# Patient Record
Sex: FEMALE | Race: WHITE | HISPANIC OR LATINO | Employment: FULL TIME | URBAN - METROPOLITAN AREA
[De-identification: names, ages, dates, MRNs, and addresses within clinical notes are randomized per-mention and may not be internally consistent; named-entity substitution may affect disease eponyms.]

---

## 2019-06-19 ENCOUNTER — HOSPITAL ENCOUNTER (EMERGENCY)
Facility: MEDICAL CENTER | Age: 49
End: 2019-06-19
Attending: EMERGENCY MEDICINE

## 2019-06-19 VITALS
OXYGEN SATURATION: 97 % | WEIGHT: 163.58 LBS | RESPIRATION RATE: 16 BRPM | DIASTOLIC BLOOD PRESSURE: 89 MMHG | SYSTOLIC BLOOD PRESSURE: 130 MMHG | HEART RATE: 68 BPM | BODY MASS INDEX: 30.88 KG/M2 | TEMPERATURE: 97.6 F | HEIGHT: 61 IN

## 2019-06-19 DIAGNOSIS — M54.50 LUMBAR BACK PAIN: ICD-10-CM

## 2019-06-19 PROCEDURE — A9270 NON-COVERED ITEM OR SERVICE: HCPCS | Performed by: EMERGENCY MEDICINE

## 2019-06-19 PROCEDURE — 700111 HCHG RX REV CODE 636 W/ 250 OVERRIDE (IP): Performed by: EMERGENCY MEDICINE

## 2019-06-19 PROCEDURE — 96372 THER/PROPH/DIAG INJ SC/IM: CPT

## 2019-06-19 PROCEDURE — 99284 EMERGENCY DEPT VISIT MOD MDM: CPT

## 2019-06-19 PROCEDURE — 700102 HCHG RX REV CODE 250 W/ 637 OVERRIDE(OP): Performed by: EMERGENCY MEDICINE

## 2019-06-19 RX ORDER — NAPROXEN 500 MG/1
500 TABLET ORAL 2 TIMES DAILY WITH MEALS
Qty: 15 TAB | Refills: 0 | Status: SHIPPED | OUTPATIENT
Start: 2019-06-19

## 2019-06-19 RX ORDER — CYCLOBENZAPRINE HCL 10 MG
10 TABLET ORAL 3 TIMES DAILY PRN
Qty: 15 TAB | Refills: 0 | Status: SHIPPED | OUTPATIENT
Start: 2019-06-19

## 2019-06-19 RX ORDER — PRAVASTATIN SODIUM 20 MG
20 TABLET ORAL NIGHTLY
COMMUNITY

## 2019-06-19 RX ORDER — CYCLOBENZAPRINE HCL 10 MG
10 TABLET ORAL ONCE
Status: COMPLETED | OUTPATIENT
Start: 2019-06-19 | End: 2019-06-19

## 2019-06-19 RX ORDER — KETOROLAC TROMETHAMINE 30 MG/ML
INJECTION, SOLUTION INTRAMUSCULAR; INTRAVENOUS
Status: DISCONTINUED
Start: 2019-06-19 | End: 2019-06-19 | Stop reason: HOSPADM

## 2019-06-19 RX ORDER — KETOROLAC TROMETHAMINE 30 MG/ML
15 INJECTION, SOLUTION INTRAMUSCULAR; INTRAVENOUS ONCE
Status: COMPLETED | OUTPATIENT
Start: 2019-06-19 | End: 2019-06-19

## 2019-06-19 RX ORDER — LISINOPRIL 10 MG/1
10 TABLET ORAL DAILY
COMMUNITY

## 2019-06-19 RX ADMIN — CYCLOBENZAPRINE 10 MG: 10 TABLET, FILM COATED ORAL at 02:17

## 2019-06-19 RX ADMIN — KETOROLAC TROMETHAMINE 15 MG: 30 INJECTION, SOLUTION INTRAMUSCULAR; INTRAVENOUS at 02:17

## 2019-06-19 NOTE — ED NOTES
Pt reports relief from medication. Reviewed DC instructions with pt. Provided prescriptions X2. Pt verbalized understanding. Pt ambulatory with steady gait to lobby be transported home by friend. VSS on room air. A/Ox4 leaving unit

## 2019-06-19 NOTE — ED PROVIDER NOTES
"ED Provider Note    CHIEF COMPLAINT  Chief Complaint   Patient presents with   • Back Pain     R sided low back pain for a few weeks, worsened significantly today       HPI  Cheryl Mcintosh is a 48 y.o. female who presents with back pain on the left side.  She does it does not radiate.  She says is been worse since she is been sitting.  She denies any IV drug use.  She denies any fevers or chills.  She denies any loss of bladder or bowel symptoms.  She says been gone for a few weeks and significantly worse today.    REVIEW OF SYSTEMS  See HPI for further details. All other systems are negative.     PAST MEDICAL HISTORY   has a past medical history of Hypertension.    SOCIAL HISTORY  Social History     Social History Main Topics   • Smoking status: Never Smoker   • Smokeless tobacco: Never Used   • Alcohol use Yes      Comment: occ   • Drug use: No   • Sexual activity: Not on file       SURGICAL HISTORY  patient denies any surgical history    CURRENT MEDICATIONS  Home Medications     Reviewed by Sunita Mcelroy R.N. (Registered Nurse) on 06/19/19 at 0119  Med List Status: Complete   Medication Last Dose Status   lisinopril (PRINIVIL) 10 MG Tab  Active   pravastatin (PRAVACHOL) 20 MG Tab  Active                ALLERGIES  No Known Allergies    PHYSICAL EXAM  VITAL SIGNS: /92   Pulse 70   Temp 36.4 °C (97.6 °F) (Temporal)   Resp 18   Ht 1.549 m (5' 1\")   Wt 74.2 kg (163 lb 9.3 oz)   SpO2 97%   BMI 30.91 kg/m²  @[place][[id number]::@  Pulse ox interpretation: I interpret this pulse ox as normal.  Constitutional: Alert in no apparent distress.  HENT: Normocephalic, Atraumatic, Bilateral external ears normal. Nose normal.   Eyes: Pupils are equal and reactive. Conjunctiva normal, non-icteric.   Heart: Regular rate and rythm, no murmurs.    Lungs: Clear to auscultation bilaterally.  Skin: Warm, Dry, No erythema, No rash.   Neurologic: Alert, Grossly non-focal.   Psychiatric: Affect normal, Judgment normal, Mood normal, " Appears appropriate and not intoxicated.   5/5 strength in flexion and extension of hips, knees as well as 5 out of 5 strength in dorsiflexion and plantar flexion. The patient has sensation intact to light touch throughout his lower extremities as well as sensation to light touch in the saddle region.          COURSE & MEDICAL DECISION MAKING  Pertinent Labs & Imaging studies reviewed. (See chart for details)    48-year-old female who presents with back pain.  There is no red flags.  I will give the patient Toradol as well as cyclobenzaprine and reassess.  There is no symptoms to suggest a renal stone at this time or ureteral stone.    After the pain medication the patient's been much improved.  I will send her home with naproxen as well as cyclobenzaprine and have her follow-up with primary care physician.  FINAL IMPRESSION  1. Lumbar back pain              Electronically signed by: Jesus Robles, 6/19/2019 2:00 AM

## 2025-06-06 NOTE — ED TRIAGE NOTES
"Chief Complaint   Patient presents with   • Back Pain     R sided low back pain for a few weeks, worsened significantly today     Pt ambulatory to triage with slow gait for above complaint. Pt appears uncomfortable. Pt thought the pain was from sitting all day in her job but noticed today she could barely stand and walk on her R side. Pt denies difficulty or pain with urinating. Pain doesn't radiate anywhere.   Pt educated on triage process and informed to notify staff of any changes.   /92   Pulse 70   Temp 36.4 °C (97.6 °F) (Temporal)   Resp 18   Ht 1.549 m (5' 1\")   Wt 74.2 kg (163 lb 9.3 oz)   SpO2 97%   BMI 30.91 kg/m²     "
electronic